# Patient Record
Sex: MALE | Race: WHITE | NOT HISPANIC OR LATINO
[De-identification: names, ages, dates, MRNs, and addresses within clinical notes are randomized per-mention and may not be internally consistent; named-entity substitution may affect disease eponyms.]

---

## 2018-01-08 ENCOUNTER — TRANSCRIPTION ENCOUNTER (OUTPATIENT)
Age: 43
End: 2018-01-08

## 2018-07-17 ENCOUNTER — EMERGENCY (EMERGENCY)
Facility: HOSPITAL | Age: 43
LOS: 1 days | Discharge: HOME | End: 2018-07-17
Attending: EMERGENCY MEDICINE
Payer: COMMERCIAL

## 2018-07-17 VITALS
OXYGEN SATURATION: 98 % | HEART RATE: 100 BPM | HEIGHT: 77 IN | TEMPERATURE: 98 F | DIASTOLIC BLOOD PRESSURE: 143 MMHG | RESPIRATION RATE: 19 BRPM | SYSTOLIC BLOOD PRESSURE: 214 MMHG | WEIGHT: 220.02 LBS

## 2018-07-17 LAB
ALBUMIN SERPL ELPH-MCNC: 4.6 G/DL — SIGNIFICANT CHANGE UP (ref 3.3–5)
ALP SERPL-CCNC: 95 U/L — SIGNIFICANT CHANGE UP (ref 40–120)
ALT FLD-CCNC: 38 U/L — SIGNIFICANT CHANGE UP (ref 10–45)
ANION GAP SERPL CALC-SCNC: 15 MMOL/L — SIGNIFICANT CHANGE UP (ref 5–17)
AST SERPL-CCNC: 23 U/L — SIGNIFICANT CHANGE UP (ref 10–40)
BASOPHILS # BLD AUTO: 0.1 K/UL — SIGNIFICANT CHANGE UP (ref 0–0.2)
BASOPHILS NFR BLD AUTO: 0.8 % — SIGNIFICANT CHANGE UP (ref 0–2)
BILIRUB SERPL-MCNC: 0.8 MG/DL — SIGNIFICANT CHANGE UP (ref 0.2–1.2)
BUN SERPL-MCNC: 14 MG/DL — SIGNIFICANT CHANGE UP (ref 7–23)
CALCIUM SERPL-MCNC: 10.2 MG/DL — SIGNIFICANT CHANGE UP (ref 8.4–10.5)
CHLORIDE SERPL-SCNC: 92 MMOL/L — LOW (ref 96–108)
CO2 SERPL-SCNC: 32 MMOL/L — HIGH (ref 22–31)
CREAT SERPL-MCNC: 1.04 MG/DL — SIGNIFICANT CHANGE UP (ref 0.5–1.3)
EOSINOPHIL # BLD AUTO: 0.3 K/UL — SIGNIFICANT CHANGE UP (ref 0–0.5)
EOSINOPHIL NFR BLD AUTO: 3 % — SIGNIFICANT CHANGE UP (ref 0–6)
GLUCOSE SERPL-MCNC: 149 MG/DL — HIGH (ref 70–99)
HCT VFR BLD CALC: 44.1 % — SIGNIFICANT CHANGE UP (ref 39–50)
HGB BLD-MCNC: 16.1 G/DL — SIGNIFICANT CHANGE UP (ref 13–17)
LYMPHOCYTES # BLD AUTO: 2.5 K/UL — SIGNIFICANT CHANGE UP (ref 1–3.3)
LYMPHOCYTES # BLD AUTO: 23.9 % — SIGNIFICANT CHANGE UP (ref 13–44)
MCHC RBC-ENTMCNC: 31.2 PG — SIGNIFICANT CHANGE UP (ref 27–34)
MCHC RBC-ENTMCNC: 36.6 GM/DL — HIGH (ref 32–36)
MCV RBC AUTO: 85.3 FL — SIGNIFICANT CHANGE UP (ref 80–100)
MONOCYTES # BLD AUTO: 0.6 K/UL — SIGNIFICANT CHANGE UP (ref 0–0.9)
MONOCYTES NFR BLD AUTO: 5.7 % — SIGNIFICANT CHANGE UP (ref 2–14)
NEUTROPHILS # BLD AUTO: 7 K/UL — SIGNIFICANT CHANGE UP (ref 1.8–7.4)
NEUTROPHILS NFR BLD AUTO: 66.5 % — SIGNIFICANT CHANGE UP (ref 43–77)
PLATELET # BLD AUTO: 214 K/UL — SIGNIFICANT CHANGE UP (ref 150–400)
POTASSIUM SERPL-MCNC: 3.2 MMOL/L — LOW (ref 3.5–5.3)
POTASSIUM SERPL-SCNC: 3.2 MMOL/L — LOW (ref 3.5–5.3)
PROT SERPL-MCNC: 8.2 G/DL — SIGNIFICANT CHANGE UP (ref 6–8.3)
RBC # BLD: 5.17 M/UL — SIGNIFICANT CHANGE UP (ref 4.2–5.8)
RBC # FLD: 12.6 % — SIGNIFICANT CHANGE UP (ref 10.3–14.5)
SODIUM SERPL-SCNC: 139 MMOL/L — SIGNIFICANT CHANGE UP (ref 135–145)
WBC # BLD: 10.5 K/UL — SIGNIFICANT CHANGE UP (ref 3.8–10.5)
WBC # FLD AUTO: 10.5 K/UL — SIGNIFICANT CHANGE UP (ref 3.8–10.5)

## 2018-07-17 PROCEDURE — 71045 X-RAY EXAM CHEST 1 VIEW: CPT | Mod: 26

## 2018-07-17 PROCEDURE — 99218: CPT

## 2018-07-17 RX ORDER — ALPRAZOLAM 0.25 MG
1 TABLET ORAL ONCE
Qty: 0 | Refills: 0 | Status: DISCONTINUED | OUTPATIENT
Start: 2018-07-17 | End: 2018-07-17

## 2018-07-17 RX ORDER — HYDRALAZINE HCL 50 MG
10 TABLET ORAL ONCE
Qty: 0 | Refills: 0 | Status: DISCONTINUED | OUTPATIENT
Start: 2018-07-17 | End: 2018-07-17

## 2018-07-17 RX ORDER — METOPROLOL TARTRATE 50 MG
10 TABLET ORAL ONCE
Qty: 0 | Refills: 0 | Status: COMPLETED | OUTPATIENT
Start: 2018-07-17 | End: 2018-07-17

## 2018-07-17 RX ORDER — HYDROCHLOROTHIAZIDE 25 MG
25 TABLET ORAL ONCE
Qty: 0 | Refills: 0 | Status: COMPLETED | OUTPATIENT
Start: 2018-07-17 | End: 2018-07-17

## 2018-07-17 RX ORDER — SODIUM CHLORIDE 9 MG/ML
3 INJECTION INTRAMUSCULAR; INTRAVENOUS; SUBCUTANEOUS EVERY 8 HOURS
Qty: 0 | Refills: 0 | Status: DISCONTINUED | OUTPATIENT
Start: 2018-07-17 | End: 2018-07-21

## 2018-07-17 RX ORDER — CARVEDILOL PHOSPHATE 80 MG/1
25 CAPSULE, EXTENDED RELEASE ORAL ONCE
Qty: 0 | Refills: 0 | Status: COMPLETED | OUTPATIENT
Start: 2018-07-17 | End: 2018-07-17

## 2018-07-17 RX ORDER — HYDRALAZINE HCL 50 MG
100 TABLET ORAL ONCE
Qty: 0 | Refills: 0 | Status: DISCONTINUED | OUTPATIENT
Start: 2018-07-17 | End: 2018-07-17

## 2018-07-17 RX ORDER — ACETAMINOPHEN 500 MG
975 TABLET ORAL ONCE
Qty: 0 | Refills: 0 | Status: COMPLETED | OUTPATIENT
Start: 2018-07-17 | End: 2018-07-17

## 2018-07-17 RX ORDER — HYDRALAZINE HCL 50 MG
10 TABLET ORAL ONCE
Qty: 0 | Refills: 0 | Status: COMPLETED | OUTPATIENT
Start: 2018-07-17 | End: 2018-07-17

## 2018-07-17 RX ORDER — AMLODIPINE BESYLATE 2.5 MG/1
10 TABLET ORAL ONCE
Qty: 0 | Refills: 0 | Status: COMPLETED | OUTPATIENT
Start: 2018-07-17 | End: 2018-07-17

## 2018-07-17 RX ORDER — HYDRALAZINE HCL 50 MG
20 TABLET ORAL ONCE
Qty: 0 | Refills: 0 | Status: COMPLETED | OUTPATIENT
Start: 2018-07-17 | End: 2018-07-17

## 2018-07-17 RX ORDER — VALSARTAN 80 MG/1
320 TABLET ORAL ONCE
Qty: 0 | Refills: 0 | Status: COMPLETED | OUTPATIENT
Start: 2018-07-17 | End: 2018-07-17

## 2018-07-17 RX ADMIN — AMLODIPINE BESYLATE 10 MILLIGRAM(S): 2.5 TABLET ORAL at 19:32

## 2018-07-17 RX ADMIN — Medication 10 MILLIGRAM(S): at 21:57

## 2018-07-17 RX ADMIN — Medication 1 MILLIGRAM(S): at 17:17

## 2018-07-17 RX ADMIN — SODIUM CHLORIDE 3 MILLILITER(S): 9 INJECTION INTRAMUSCULAR; INTRAVENOUS; SUBCUTANEOUS at 22:18

## 2018-07-17 RX ADMIN — Medication 20 MILLIGRAM(S): at 23:14

## 2018-07-17 RX ADMIN — Medication 25 MILLIGRAM(S): at 19:32

## 2018-07-17 RX ADMIN — CARVEDILOL PHOSPHATE 25 MILLIGRAM(S): 80 CAPSULE, EXTENDED RELEASE ORAL at 19:32

## 2018-07-17 RX ADMIN — Medication 975 MILLIGRAM(S): at 22:29

## 2018-07-17 RX ADMIN — Medication 10 MILLIGRAM(S): at 17:17

## 2018-07-17 RX ADMIN — VALSARTAN 320 MILLIGRAM(S): 80 TABLET ORAL at 19:32

## 2018-07-17 NOTE — ED ADULT NURSE NOTE - OBJECTIVE STATEMENT
44 yo M arrived to the ed c/o HTN; pt was seen by cards today, found to be hypertensive; denies any complaints; denies cp/sob/nvd/blurred vision/ha; pt reports he has not checked his bp in 6 months; also reports social issues causing increased stress r/t tristin of loosing job

## 2018-07-17 NOTE — ED CDU PROVIDER INITIAL DAY NOTE - PROGRESS NOTE DETAILS
Patient is aox3, speaking clear coherent sentences, no signs of distress noted. Patient reports having mild 3/10 headache, otherwise asymptomatic. Will order Tylenol 975mg po, continue to monitor /115. Patient received 20mg Hydralazine IVP. States headache has subsided. Will continue to monitor.

## 2018-07-17 NOTE — ED PROVIDER NOTE - PROGRESS NOTE DETAILS
Dr. Troncoso Note: spoke to Dr. Yuan, states pt likely not compliant with meds, patient does not follow through with appointments and only went after one year when he stopped giving refills automatically.  Suggests giving pt his home meds regimen now and f/u outpt, will see patient in 48hrs. Dr. Troncoso Note: bp still same, will cdu for bp control and echo.

## 2018-07-17 NOTE — ED PROVIDER NOTE - ATTENDING CONTRIBUTION TO CARE
Pt sent by cards for elevated BP, last BP check 6months ago with 140s/100s.  Does not check at home.  No chest pain, sob, headache, numbness, tingling.  +stress, recently lost job.  Appears well, sad, crying at times.

## 2018-07-17 NOTE — ED CDU PROVIDER INITIAL DAY NOTE - OBJECTIVE STATEMENT
Patient is 43y M with PMH htn, DM2 on metformin, compliant with htn meds presenting with hypertension sent in from cards office today. Denies HA/vision changes/SOB/chest pain, feels in usual state of health but is under a lot of stress at work. Does not regularly check his BP but thinks it is usually 140s/90s. Fam hx of CAD in parents in their 80s.   PMD/cards: Lissy    In ED, Patient had ekg no signs of acute ischemia, laboratory testing unremarkable CXR no signs of acute pathology and received antihypertensive medications. Pt received home medications of Amlodipine 10mg po, Carvedilol 25mg po, HCTZ 25mg po, Valsartan 320mg po and Hydralazine 10mg IV. Pt remained hypertensive in the 200's/120's asymptomatic. c/d/w with patient's Cardiologist for transfer to CDU, continue BP management, telemetry, frequent eval, and echo to determine need for inpatient stay for persistently elevated blood pressure.

## 2018-07-17 NOTE — ED PROVIDER NOTE - PHYSICAL EXAMINATION
Gen: NAD, AOx3  Head: NCAT  HEENT: PERRL, oral mucosa moist, normal conjunctiva  Lung: CTAB, no respiratory distress  CV: rrr, no murmurs, Normal perfusion  Abd: soft, NTND, no CVA tenderness  MSK: No edema, no visible deformities  Neuro: No focal neurologic deficits, CN intact, motor and sensation intact, no dysmetria, no pronator drift   Skin: No rash   Psych: normal affect

## 2018-07-17 NOTE — ED PROVIDER NOTE - OBJECTIVE STATEMENT
Patient is 43y M with PMH htn, DM2 on metformin, compliant with htn meds presenting with hypertension sent in from cards office today. Denies HA/vision changes/SOB/chest pain, feels in usual state of health but is under a lot of stress at work. Does not regularly check his BP but thinks it is usually 140s/90s. Fam hx of CAD in parents in their 80s.     PMD/cards: Lissy AGUIRRE: Denies fever, palpitations, chills, recent sickness, HA, vision changes, cough, SOB, chest pain, abdominal pain, n/v/d/c, dysuria, hematuria, rash, new joint aches, sick contacts, and recent travel.

## 2018-07-18 VITALS
SYSTOLIC BLOOD PRESSURE: 170 MMHG | RESPIRATION RATE: 18 BRPM | HEART RATE: 95 BPM | TEMPERATURE: 98 F | DIASTOLIC BLOOD PRESSURE: 114 MMHG | OXYGEN SATURATION: 97 %

## 2018-07-18 PROCEDURE — 80053 COMPREHEN METABOLIC PANEL: CPT

## 2018-07-18 PROCEDURE — G0378: CPT

## 2018-07-18 PROCEDURE — 71045 X-RAY EXAM CHEST 1 VIEW: CPT

## 2018-07-18 PROCEDURE — 99217: CPT

## 2018-07-18 PROCEDURE — 99285 EMERGENCY DEPT VISIT HI MDM: CPT | Mod: 25

## 2018-07-18 PROCEDURE — 85027 COMPLETE CBC AUTOMATED: CPT

## 2018-07-18 PROCEDURE — 96375 TX/PRO/DX INJ NEW DRUG ADDON: CPT | Mod: XU

## 2018-07-18 PROCEDURE — 96374 THER/PROPH/DIAG INJ IV PUSH: CPT

## 2018-07-18 PROCEDURE — 96376 TX/PRO/DX INJ SAME DRUG ADON: CPT | Mod: XU

## 2018-07-18 PROCEDURE — 93306 TTE W/DOPPLER COMPLETE: CPT

## 2018-07-18 PROCEDURE — 93005 ELECTROCARDIOGRAM TRACING: CPT

## 2018-07-18 PROCEDURE — 93306 TTE W/DOPPLER COMPLETE: CPT | Mod: 26

## 2018-07-18 RX ORDER — ACETAMINOPHEN 500 MG
650 TABLET ORAL ONCE
Qty: 0 | Refills: 0 | Status: COMPLETED | OUTPATIENT
Start: 2018-07-18 | End: 2018-07-18

## 2018-07-18 RX ORDER — LOSARTAN POTASSIUM 100 MG/1
1 TABLET, FILM COATED ORAL
Qty: 30 | Refills: 0 | OUTPATIENT
Start: 2018-07-18 | End: 2018-08-16

## 2018-07-18 RX ORDER — CARVEDILOL PHOSPHATE 80 MG/1
25 CAPSULE, EXTENDED RELEASE ORAL ONCE
Qty: 0 | Refills: 0 | Status: COMPLETED | OUTPATIENT
Start: 2018-07-18 | End: 2018-07-18

## 2018-07-18 RX ORDER — AMLODIPINE BESYLATE 2.5 MG/1
1 TABLET ORAL
Qty: 0 | Refills: 0 | COMMUNITY

## 2018-07-18 RX ORDER — VALSARTAN 80 MG/1
1 TABLET ORAL
Qty: 0 | Refills: 0 | COMMUNITY

## 2018-07-18 RX ORDER — METFORMIN HYDROCHLORIDE 850 MG/1
0 TABLET ORAL
Qty: 0 | Refills: 0 | COMMUNITY

## 2018-07-18 RX ORDER — VALSARTAN 80 MG/1
320 TABLET ORAL DAILY
Qty: 0 | Refills: 0 | Status: DISCONTINUED | OUTPATIENT
Start: 2018-07-18 | End: 2018-07-21

## 2018-07-18 RX ORDER — HYDRALAZINE HCL 50 MG
50 TABLET ORAL ONCE
Qty: 0 | Refills: 0 | Status: COMPLETED | OUTPATIENT
Start: 2018-07-18 | End: 2018-07-18

## 2018-07-18 RX ORDER — AMLODIPINE BESYLATE 2.5 MG/1
10 TABLET ORAL DAILY
Qty: 0 | Refills: 0 | Status: DISCONTINUED | OUTPATIENT
Start: 2018-07-18 | End: 2018-07-21

## 2018-07-18 RX ORDER — CARVEDILOL PHOSPHATE 80 MG/1
0 CAPSULE, EXTENDED RELEASE ORAL
Qty: 0 | Refills: 0 | COMMUNITY

## 2018-07-18 RX ORDER — LOSARTAN POTASSIUM 100 MG/1
100 TABLET, FILM COATED ORAL DAILY
Qty: 0 | Refills: 0 | Status: DISCONTINUED | OUTPATIENT
Start: 2018-07-18 | End: 2018-07-21

## 2018-07-18 RX ORDER — HYDROCHLOROTHIAZIDE 25 MG
25 TABLET ORAL DAILY
Qty: 0 | Refills: 0 | Status: DISCONTINUED | OUTPATIENT
Start: 2018-07-18 | End: 2018-07-21

## 2018-07-18 RX ORDER — HYDRALAZINE HCL 50 MG
1 TABLET ORAL
Qty: 90 | Refills: 0 | OUTPATIENT
Start: 2018-07-18 | End: 2018-08-16

## 2018-07-18 RX ADMIN — AMLODIPINE BESYLATE 10 MILLIGRAM(S): 2.5 TABLET ORAL at 08:24

## 2018-07-18 RX ADMIN — SODIUM CHLORIDE 3 MILLILITER(S): 9 INJECTION INTRAMUSCULAR; INTRAVENOUS; SUBCUTANEOUS at 06:03

## 2018-07-18 RX ADMIN — Medication 50 MILLIGRAM(S): at 08:24

## 2018-07-18 RX ADMIN — LOSARTAN POTASSIUM 100 MILLIGRAM(S): 100 TABLET, FILM COATED ORAL at 12:12

## 2018-07-18 RX ADMIN — CARVEDILOL PHOSPHATE 25 MILLIGRAM(S): 80 CAPSULE, EXTENDED RELEASE ORAL at 08:25

## 2018-07-18 RX ADMIN — Medication 25 MILLIGRAM(S): at 08:25

## 2018-07-18 RX ADMIN — Medication 650 MILLIGRAM(S): at 04:16

## 2018-07-18 NOTE — ED ADULT NURSE REASSESSMENT NOTE - GENERAL PATIENT STATE
cooperative/anxious/worried BP going back UP/meds given
comfortable appearance/cooperative/smiling/interactive

## 2018-07-18 NOTE — ED CDU PROVIDER DISPOSITION NOTE - PLAN OF CARE
Take BP medication regimen as follows;   Hydralazine 50mg three times daily  Carvedilol 25mg once daily  Hydralazine 50 mg three times daily  Losartan 100mg once daily  Norvasc 10 mg once daily  Discontinue Valsartan  Follow up with Dr Yuan within the next 2 days  Bring a copy of your test results to your appointment  Follow up with your Primary Care Physician within the next 2-3 days  Return to the Emergency Room if you experience new or worsening symptoms

## 2018-07-18 NOTE — ED CDU PROVIDER DISPOSITION NOTE - CLINICAL COURSE
· HPI Objective Statement: Patient is 43y M with PMH htn, DM2 on metformin, compliant with htn meds presenting with hypertension sent in from cards office today. Denies HA/vision changes/SOB/chest pain, feels in usual state of health but is under a lot of stress at work. Does not regularly check his BP but thinks it is usually 140s/90s. Fam hx of CAD in parents in their 80s.   	PMD/cards: Lissy    	In ED, Patient had ekg no signs of acute ischemia, laboratory testing unremarkable CXR no signs of acute pathology and received antihypertensive medications. Pt received home medications of Amlodipine 10mg po, Carvedilol 25mg po, HCTZ 25mg po, Valsartan 320mg po and Hydralazine 10mg IV. Pt remained hypertensive in the 200's/120's asymptomatic. c/d/w with patient's Cardiologist for transfer to CDU, continue BP management, telemetry, frequent eval, and echo to determine need for inpatient stay for persistently elevated blood pressure. In CDU, patient's blood pressure lowered to 150's/90's and patient only complained of mild headache which he received Tylenol w/ relief. · HPI Objective Statement: Patient is 43y M with PMH htn, DM2 on metformin, compliant with htn meds presenting with hypertension sent in from cards office today. Denies HA/vision changes/SOB/chest pain, feels in usual state of health but is under a lot of stress at work. Does not regularly check his BP but thinks it is usually 140s/90s. Fam hx of CAD in parents in their 80s.   	PMD/cards: Lissy    	In ED, Patient had ekg no signs of acute ischemia, laboratory testing unremarkable CXR no signs of acute pathology and received antihypertensive medications. Pt received home medications of Amlodipine 10mg po, Carvedilol 25mg po, HCTZ 25mg po, Valsartan 320mg po and Hydralazine 10mg IV. Pt remained hypertensive in the 200's/120's asymptomatic. c/d/w with patient's Cardiologist for transfer to CDU, continue BP management, telemetry, frequent eval, and echo to determine need for inpatient stay for persistently elevated blood pressure. In CDU, patient's blood pressure lowered to 150's/90's and patient only complained of mild headache which he received Tylenol w/ relief. Patients blood pressure is reduced. I spoke w Dr Yuan and patient was evaluated by Dr Syed Doss (Cardiologist) at Dr Holcomb request. Dr Yuan made recommendations to increase BP medication regimen, and d/c home for outpatient workup for HTN. Echocardiogram revealed Left ventricular Hypertrophy as expected for chronic HTN. Case discussed w Dr Cooley

## 2018-07-18 NOTE — ED CDU PROVIDER SUBSEQUENT DAY NOTE - PROGRESS NOTE DETAILS
Patient sleeping NAD, No events on telemetry. repeat blood pressure 153/86. will continue to monitor Patient sleeping NAD, No events on telemetry. repeat blood pressure 156/96. will continue to monitor Patient resting in bed comfortably. No distress, no complaints. Vital Signs Stable. No events on telemetry monitor. BP currently well controlled, awaiting TTE. -Aakash Sosa PA-C I have personally performed a face to face diagnostic evaluation on this patient.  I have reviewed the ACP note and agree with the history, exam, and plan of care, except as noted.     Patient observed overnight for hypertensive urgency. BP much improved overnight with PO anti hypertensives, SBPs in 150s-160s, patient without symptoms. TTE ordered by previous team, awaiting results of TTE. Should TTE be unrevealing, will discuss case again with PMD to ensure close follow up and repeat BP Attending MD Cooley: TTE with LVH, no other acute abnormalities noted. Labs reviewed and notable for mild hypokalemia and metabolic alkalosis, perhaps suggestive of secondary hypertension (primary hyperaldosteronism etc.) Will consult with patient's PMD regarding whether patient has had work up in past for secondary HTN and whether further investigation needed in ED After discussion with Dr. Yuan, decision has been made to admit patient given poorly controlled HTN and possible investigation for secondary HTN given metabolic derangements patient seen by Dr. Doss (associate of Dr. Yuan), he feels patient is actually appropriate for outpatient management of HTN. Secondary HTN can be done as outpatient per Dr Doss which is reasonable given no s/s malignant HTN.

## 2018-07-18 NOTE — CONSULT NOTE ADULT - ASSESSMENT
43 year old male with PMHx HTN, DM presenting with HTN urgency admitted to CDU for observation.    1. HTN urgency   overall BP improved from arrival to ED  Echo with preserved LV function  continue norvasc, losartan   increase hydrochlorothiazide to 50mg daily   continue coreg 25mg daily   pt. would benefit from secondary outpt. HTN w/u    pt. stable for d/c , f/u with dr. matias 43 year old male with PMHx HTN, DM presenting with HTN urgency admitted to CDU for observation.    1. HTN urgency   overall BP improved from arrival to ED  Echo with preserved LV function  continue norvasc, losartan, hydralazine 50mg TID   increase hydrochlorothiazide to 50mg daily   continue coreg 25mg daily   pt. would benefit from secondary outpt. HTN w/u    pt. stable for d/c , f/u with dr. matias 43 year old male with PMHx HTN, DM presenting with HTN urgency admitted to CDU for observation.    1. HTN urgency   overall BP improved from arrival to ED  likely induced by stress/anxiety   Echo with preserved LV function  ECG NSR w LVH  ECHO hyperdynamic LVEF w LVH  continue max dose coreg, losartan, norvac, hydral 50mg TID  increase HCTZ to 25mg BID  pt. would benefit from secondary outpt. HTN w/u    pt. stable for d/c , f/u with dr. matias

## 2018-07-18 NOTE — CONSULT NOTE ADULT - ATTENDING COMMENTS
Patient seen and examined, agree with the above assessment and plan by TIARRA Iglesias.  Pt with long standing history of hypertension sent in by Dr Yuan for uncontrolled HTN with systolic >220mmHg.  Pt given all his prior BP meds with significant improvement in his pressure  He is visibly anxious, tearful, reports significant stressors at home  He denies any chest pain, dyspnea or headache  ECG NSR w LVH  ECHO hyperdynamic LVEF w LVH  Recommend continuing max dose coreg, losartan, norvac, hydral 50mg TID, increase HCTZ to 25mg BID  Suspect underlying anxiety provoking his presentation  He may benefit from a secondary hypertension workup (r/o pheo), can be done as outpt  I have no objection to DC and have the pt follow up with Dr Yuan

## 2018-07-18 NOTE — ED CDU PROVIDER SUBSEQUENT DAY NOTE - ATTENDING CONTRIBUTION TO CARE
Attending MD Cooley:   I personally have seen and examined this patient.  Physician assistant note reviewed and agree on plan of care and except where noted.  See HPI, PE, and MDM for details.

## 2018-07-18 NOTE — ED CDU PROVIDER SUBSEQUENT DAY NOTE - HISTORY
Pt resting comfortably, feeling well without complaint. NAD, No events on telemetry. repeat blood pressure 155/102

## 2018-07-18 NOTE — CONSULT NOTE ADULT - SUBJECTIVE AND OBJECTIVE BOX
CARDIOLOGY CONSULT - Dr. Doss     CHIEF COMPLAINT: htn urgency     HPI: 43 year old male with PMHx HTN, DM presenting with HTN urgency sent in by Dr. Yuan's office. Pt. states he has been feeling increase in stress lately which he feel could be contributing to his elevated BP. Pt. denies cp/sob/stallings, denies palpitations, headache, syncope dizziness. Appears anxious. all other ROS negative       PAST MEDICAL & SURGICAL HISTORY:  Diabetes  Hypertension          PREVIOUS DIAGNOSTIC TESTING:    [ ] Echocardiogram: < from: Transthoracic Echocardiogram (07.18.18 @ 09:02) >  Conclusions:  1. Increased relative wall thickness with normal left  ventricular mass index, consistent with concentric left  ventricular remodeling.  2. Hyperdynamic left ventricular systolic function.  *** No previous Echo exam.    < end of copied text >    [ ]  Catheterization:  [ ] Stress Test:  	    MEDICATIONS:  MEDICATIONS  (STANDING):  amLODIPine   Tablet 10 milliGRAM(s) Oral daily  hydrochlorothiazide 25 milliGRAM(s) Oral daily  losartan 100 milliGRAM(s) Oral daily  sodium chloride 0.9% lock flush 3 milliLiter(s) IV Push every 8 hours  valsartan 320 milliGRAM(s) Oral daily      FAMILY HISTORY:      SOCIAL HISTORY:    [x] Non-smoker  [ ] Smoker  [ ] Alcohol    Allergies    No Known Allergies    Intolerances    	    REVIEW OF SYSTEMS:  CONSTITUTIONAL: No fever, weight loss, or fatigue  EYES: No eye pain, visual disturbances, or discharge  ENMT:  No difficulty hearing, tinnitus, vertigo; No sinus or throat pain  NECK: No pain or stiffness  RESPIRATORY: No cough, wheezing, chills or hemoptysis; No Shortness of Breath  CARDIOVASCULAR: No chest pain, palpitations, passing out, dizziness, or leg swelling  GASTROINTESTINAL: No abdominal or epigastric pain. No nausea, vomiting, or hematemesis; No diarrhea or constipation. No melena or hematochezia.  GENITOURINARY: No dysuria, frequency, hematuria, or incontinence  NEUROLOGICAL: No headaches, memory loss, loss of strength, numbness, or tremors  SKIN: No itching, burning, rashes, or lesions   C/o feeling anxious 	    [x] All others negative	  [ ] Unable to obtain    PHYSICAL EXAM:  T(C): 36.7 (07-18-18 @ 12:08), Max: 36.9 (07-17-18 @ 16:35)  HR: 95 (07-18-18 @ 12:08) (80 - 101)  BP: 170/114 (07-18-18 @ 12:08) (147/89 - 241/135)  RR: 18 (07-18-18 @ 12:08) (16 - 19)  SpO2: 97% (07-18-18 @ 12:08) (95% - 100%)  Wt(kg): --  I&O's Summary      Appearance: Normal	  Psychiatry: A & O x 3, Mood & affect appropriate  HEENT:   Normal oral mucosa, PERRL, EOMI	  Lymphatic: No lymphadenopathy  Cardiovascular: Normal S1 S2,RRR, No JVD, No murmurs  Respiratory: Lungs clear to auscultation	  Gastrointestinal:  Soft, Non-tender, + BS	  Skin: No rashes, No ecchymoses, No cyanosis	  Neurologic: Non-focal  Extremities: Normal range of motion, No clubbing, cyanosis or edema  Vascular: Peripheral pulses palpable 2+ bilaterally    TELEMETRY: NSR, LVH 	    ECG:  	  RADIOLOGY:< from: Xray Chest 1 View AP/PA (07.17.18 @ 21:54) >  IMPRESSION:   Clear lungs.    < end of copied text >     OTHER: 	  	  LABS:	 	    CARDIAC MARKERS:                                  16.1   10.5  )-----------( 214      ( 17 Jul 2018 17:16 )             44.1     07-17    139  |  92<L>  |  14  ----------------------------<  149<H>  3.2<L>   |  32<H>  |  1.04    Ca    10.2      17 Jul 2018 17:16    TPro  8.2  /  Alb  4.6  /  TBili  0.8  /  DBili  x   /  AST  23  /  ALT  38  /  AlkPhos  95  07-17      proBNP:   Lipid Profile:   HgA1c:   TSH:

## 2018-07-18 NOTE — ED ADULT NURSE REASSESSMENT NOTE - NS ED NURSE REASSESS COMMENT FT1
Xanax given per Pt request to MD stating he is extremely stressed at work and will potentiallly lose his job at the end of next month. mood and affect appropriate. No acute distress. hydralazine ivp given for elevated bp. denies visual changes, headache, or chest pain. will cont to monitor. vital signs noted on flowsheet. on cardiac monitor. will cont to monitor.
report taken from Halie PRYOR. pt awaiting TTE. will continue to monitor.
Pt received from KONSTANTIN Mar. Pt oriented to CDU & plan of care was discussed. Pt endorses 3/10 headache. Pt denies any other symptoms. Pt BP being recycled via bedside monitor q45 minutes. Safety & comfort measures maintained. Call bell in reach. Will continue to monitor.

## 2018-08-01 PROBLEM — E11.9 TYPE 2 DIABETES MELLITUS WITHOUT COMPLICATIONS: Chronic | Status: ACTIVE | Noted: 2018-07-17

## 2018-08-01 PROBLEM — Z00.00 ENCOUNTER FOR PREVENTIVE HEALTH EXAMINATION: Status: ACTIVE | Noted: 2018-08-01

## 2018-08-01 PROBLEM — I10 ESSENTIAL (PRIMARY) HYPERTENSION: Chronic | Status: ACTIVE | Noted: 2018-07-17

## 2018-08-08 ENCOUNTER — APPOINTMENT (OUTPATIENT)
Dept: ULTRASOUND IMAGING | Facility: IMAGING CENTER | Age: 43
End: 2018-08-08
Payer: COMMERCIAL

## 2018-08-08 ENCOUNTER — OUTPATIENT (OUTPATIENT)
Dept: OUTPATIENT SERVICES | Facility: HOSPITAL | Age: 43
LOS: 1 days | End: 2018-08-08
Payer: COMMERCIAL

## 2018-08-08 DIAGNOSIS — I15.9 SECONDARY HYPERTENSION, UNSPECIFIED: ICD-10-CM

## 2018-08-08 DIAGNOSIS — Z00.8 ENCOUNTER FOR OTHER GENERAL EXAMINATION: ICD-10-CM

## 2018-08-08 DIAGNOSIS — E87.6 HYPOKALEMIA: ICD-10-CM

## 2018-08-08 DIAGNOSIS — E11.9 TYPE 2 DIABETES MELLITUS WITHOUT COMPLICATIONS: ICD-10-CM

## 2018-08-08 PROCEDURE — 93975 VASCULAR STUDY: CPT

## 2018-08-08 PROCEDURE — 93975 VASCULAR STUDY: CPT | Mod: 26

## 2019-05-28 ENCOUNTER — TRANSCRIPTION ENCOUNTER (OUTPATIENT)
Age: 44
End: 2019-05-28

## 2019-07-24 NOTE — ED ADULT TRIAGE NOTE - CCCP TRG CHIEF CMPLNT
hypertension pt received sitting in bed, Cantonese speaking, pt was drinking ETOH today, brought in by EMS for ETOH abuse. pt febrile at triage. pt received sitting in bed, Cantonese speaking, pt was drinking ETOH today, brought in by EMS for ETOH abuse. pt febrile at triage. pt states he drank several beers today.

## 2020-12-14 ENCOUNTER — TRANSCRIPTION ENCOUNTER (OUTPATIENT)
Age: 45
End: 2020-12-14

## 2021-04-15 ENCOUNTER — TRANSCRIPTION ENCOUNTER (OUTPATIENT)
Age: 46
End: 2021-04-15

## 2024-01-05 ENCOUNTER — APPOINTMENT (OUTPATIENT)
Dept: ORTHOPEDIC SURGERY | Facility: CLINIC | Age: 49
End: 2024-01-05
Payer: COMMERCIAL

## 2024-01-05 VITALS — WEIGHT: 199 LBS | HEIGHT: 72 IN | BODY MASS INDEX: 26.95 KG/M2

## 2024-01-05 DIAGNOSIS — I10 ESSENTIAL (PRIMARY) HYPERTENSION: ICD-10-CM

## 2024-01-05 DIAGNOSIS — E11.9 TYPE 2 DIABETES MELLITUS W/OUT COMPLICATIONS: ICD-10-CM

## 2024-01-05 DIAGNOSIS — M75.02 ADHESIVE CAPSULITIS OF LEFT SHOULDER: ICD-10-CM

## 2024-01-05 DIAGNOSIS — M75.32 CALCIFIC TENDINITIS OF LEFT SHOULDER: ICD-10-CM

## 2024-01-05 PROCEDURE — 73030 X-RAY EXAM OF SHOULDER: CPT | Mod: LT

## 2024-01-05 PROCEDURE — 73010 X-RAY EXAM OF SHOULDER BLADE: CPT | Mod: LT

## 2024-01-05 PROCEDURE — 99204 OFFICE O/P NEW MOD 45 MIN: CPT | Mod: 25

## 2024-01-05 PROCEDURE — 20611 DRAIN/INJ JOINT/BURSA W/US: CPT | Mod: LT

## 2024-01-05 RX ORDER — AZILSARTAN KAMEDOXOMIL 80 MG/1
TABLET ORAL
Refills: 0 | Status: ACTIVE | COMMUNITY

## 2024-01-05 RX ORDER — METFORMIN HYDROCHLORIDE 625 MG/1
TABLET ORAL
Refills: 0 | Status: ACTIVE | COMMUNITY

## 2024-01-05 RX ORDER — CARVEDILOL 3.12 MG/1
TABLET, FILM COATED ORAL
Refills: 0 | Status: ACTIVE | COMMUNITY

## 2024-01-05 RX ORDER — HYDROCHLOROTHIAZIDE 12.5 MG/1
CAPSULE ORAL
Refills: 0 | Status: ACTIVE | COMMUNITY

## 2024-01-05 NOTE — PHYSICAL EXAM
[Left] : left shoulder [Moderate] : moderate [4 ___] : forward flexion 4[unfilled]/5 [Right] : right shoulder [] : no sensory deficits [FreeTextEntry9] : 160/60/T12 [TWNoteComboBox4] : passive forward flexion 135 degrees [de-identified] : external rotation 30 degrees [TWNoteComboBox5] : internal rotation at 90 degrees of abduction 20 degrees

## 2024-01-05 NOTE — REASON FOR VISIT
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  No prior history.  He does lift weights, but did not have a specific injury.  He has rested with continued symptoms.  The shoulder feels stiff.  Nights are painful.  No numbness.  Advil hasn't done much.  His last A1C was 6.5.

## 2024-01-05 NOTE — IMAGING
[Left] : left shoulder [FreeTextEntry1] : The GH is OK.  There are AC changes.  There are two fairly large calcium deposits about supraspinatus.  [FreeTextEntry5] : There is a Type II acromion.

## 2024-01-05 NOTE — HISTORY OF PRESENT ILLNESS
[de-identified] : 47 yo LHD M here for left shoulder pain that began 1 month ago. States it could be due to working out/lifting weights. No prior issues to the right shoulder. No formal treatment to date.

## 2024-01-05 NOTE — ASSESSMENT
[FreeTextEntry1] : We reviewed the findings and the history. Questions were answered and concerns addressed. The options were outlined considering persistent soreness and stiffness.  PT is planned - table slide stretch demonstrated in office.  Reviewed risks of injection associated with Diabetes.  The role of DM outlined. Plan for LT shoulder calcium direction injection.  He will take OTC 2x Aleve and 2x Tylenol.   Patient was seen by Dr. Charly Garza. Patient was seen by Petty PATEL under the supervision of Dr. Charly Garza. Progress note was completed by Petty PATEL.  Procedure Name: Large Joint Injection / Aspiration: Depomedrol, Lidocaine and Guidance Ultrasound   Large Joint Injection was performed because of pain and inflammation. Depomedrol: An injection of Depomedrol 40 mg , 2 cc. Lidocaine: An injection of Lidocaine 1 mg , 13 cc.   Medication was injected in the left subacromial space and glenohumeral joint. Patient has tried OTC's including aspirin, Ibuprofen, Aleve etc. or prescription NSAIDS, and/or exercises at home and/ or physical therapy without satisfactory response. The risks, benefits, and alternatives to steroid injection were explained in full to the patient. Risks outlined include but are not limited to infection, sepsis, bleeding, scarring, skin discoloration, temporary increase in pain, syncopal episode, failure to resolve symptoms, allergic reaction, symptom recurrence, and elevation of blood sugar in diabetics. Patient understood the risks. All questions were answered. After discussion, patient requested an injection. Oral informed consent was obtained.  Sterile preparation with betadine and aseptic technique was utilized for the procedure, including the preparation of the solutions used for the injection. Patient tolerated the procedure well.  Post Procedure Instructions: Patient was advised to call if redness, pain, or fever occur and apply ice for 15 min. out of every hour for the next 12-24 hours as tolerated. Patient was advised to rest the joint(s) for 3 days.  Advised to ice the injection site this evening. Ultrasound Guidance was used for the following reasons: for precise injection in area of tear. Visualization of the needle and placement of injection was performed without complication.

## 2024-03-01 ENCOUNTER — APPOINTMENT (OUTPATIENT)
Dept: ORTHOPEDIC SURGERY | Facility: CLINIC | Age: 49
End: 2024-03-01

## 2025-07-28 NOTE — ED CDU PROVIDER SUBSEQUENT DAY NOTE - TEMPLATE, MLM
CC:  Ze Dowd is here today for Physical      Medications: medications verified, no change  Added preferred pharmacy  Refills needed today?  NO    denies Latex allergy or sensitivity       Health Maintenance       Pneumococcal Vaccine 0-49 (2 of 2 - PCV)  Overdue since 11/10/2009    Colorectal Cancer Screen (View Topic Details)  Ordered on 1/21/2025    Influenza Vaccine (1)  Overdue since 9/1/2024    COVID-19 Vaccine (1 - 2024-25 season)  Never done           Following review of the above:  Patient wishes to discuss with clinician: Colorectal Cancer Screening  Patient is not proceeding with: COVID-19 and Influenza    Note: Refer to final orders and clinician documentation.          Patient would like communication of their results via:    [employer]    Cell Phone:   Telephone Information:   Mobile [phone]     Okay to leave a message containing results? Yes    Recent PHQ 2/9 Score    PHQ 2:  PHQ 2 Score Adult PHQ 2 Score Adult PHQ 2 Interpretation Little interest or pleasure in activity?   2/19/2025  10:16 AM 0 No further screening needed 0       PHQ 9:     PHQ-2/9 Depression Screening  Little interest or pleasure in activity?: Not at all  Feeling down, depressed or hopeless?: Not at all  Initial depression screening score:: 0  PHQ2 Interpretation: No further screening needed                Refill request received from [facility] for Entresto 49-51mg twice daily.   General